# Patient Record
Sex: MALE | Race: AMERICAN INDIAN OR ALASKA NATIVE
[De-identification: names, ages, dates, MRNs, and addresses within clinical notes are randomized per-mention and may not be internally consistent; named-entity substitution may affect disease eponyms.]

---

## 2018-01-23 ENCOUNTER — HOSPITAL ENCOUNTER (OUTPATIENT)
Dept: HOSPITAL 5 - OR | Age: 58
Discharge: HOME | End: 2018-01-23
Attending: SURGERY
Payer: COMMERCIAL

## 2018-01-23 VITALS — DIASTOLIC BLOOD PRESSURE: 84 MMHG | SYSTOLIC BLOOD PRESSURE: 141 MMHG

## 2018-01-23 DIAGNOSIS — I10: ICD-10-CM

## 2018-01-23 DIAGNOSIS — Z87.891: ICD-10-CM

## 2018-01-23 DIAGNOSIS — M79.89: Primary | ICD-10-CM

## 2018-01-23 PROCEDURE — 84132 ASSAY OF SERUM POTASSIUM: CPT

## 2018-01-23 PROCEDURE — 21012 EXC FACE LES SBQ 2 CM/>: CPT

## 2018-01-23 PROCEDURE — 36415 COLL VENOUS BLD VENIPUNCTURE: CPT

## 2018-01-23 PROCEDURE — 88307 TISSUE EXAM BY PATHOLOGIST: CPT

## 2018-01-23 PROCEDURE — 88304 TISSUE EXAM BY PATHOLOGIST: CPT

## 2018-01-23 PROCEDURE — 88305 TISSUE EXAM BY PATHOLOGIST: CPT

## 2018-01-23 PROCEDURE — 21552 EXC NECK LES SC 3 CM/>: CPT

## 2018-01-23 NOTE — ANESTHESIA CONSULTATION
Anesthesia Consult and Med Hx





- Airway


Anesthetic Teeth Evaluation: Partials


ROM Head & Neck: Adequate


Mental/Hyoid Distance: Adequate


Mallampati Class: Class II


Intubation Access Assessment: Probably Good





- Pulmonary Exam


CTA: Yes





- Cardiac Exam


Cardiac Exam: RRR





- Pre-Operative Health Status


ASA Pre-Surgery Classification: ASA2


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 

complications)





- Pulmonary


Hx Smoking: Yes (SMOKED X 20 YR- STOPPED X 10 YRS)


Hx Sleep Apnea: No (CHRISTIANO PRE SCREEN HIGH RISK)





- Cardiovascular System


Hx Hypertension: Yes (2006)





- Central Nervous System


Hx Psychiatric Problems: No





- Other Systems


Hx Alcohol Use: No


Hx Substance Use: Yes (MARIJUANA; last a week ago)


Hx Cancer: No

## 2018-01-23 NOTE — SHORT STAY SUMMARY
Short Stay Documentation


Date of service: 01/23/18





- History


Principal diagnosis: multiple soft tissues masses of face and neck


H&P: obtained from office





- Allergies and Medications


Current Medications: 


 Allergies





No Known Allergies Allergy (Verified 01/18/18 15:29)


 





 Home Medications











 Medication  Instructions  Recorded  Confirmed  Last Taken  Type


 


Lisinopril/Hydrochlorothiazide 1 each PO DAILY 12/20/17 01/23/18 01/22/18 09:00 

History





[Zestoretic 10-12.5 mg Tablet]     


 


Verapamil ER [Calan Sr] 180 mg PO DAILY 12/20/17 01/23/18 01/23/18 06:00 History


 


Ibuprofen 800 mg PO Q8H PRN #30 tablet 01/23/18  Unknown Rx








Active Medications





Cefazolin Sodium (Ancef/Sterile Water 2 Gm/20 Ml)  2 gm IV PREOP NR


   Stop: 01/23/18 12:00


Famotidine (Pepcid)  20 mg IV PREOP NR


   Stop: 01/23/18 12:00


   Last Admin: 01/23/18 08:34 Dose:  20 mg


Sodium Chloride (Nacl 0.9% 1000 Ml)  1,000 mls @ 100 mls/hr IV AS DIRECT ANNI


   Stop: 01/23/18 23:59


   Last Admin: 01/23/18 08:33 Dose:  100 mls/hr











- Brief post op/procedure progress note


Date of procedure: 01/23/18


Pre-op diagnosis: multiple soft tissue masses face and neck


Post-op diagnosis: same


Procedure: 





excision of multiple soft tissue masses head and neck


Anesthesia: MAC, local


Findings: 





4 cysts:


1. Left neck: 2 cm


2. Right neck: 1 cm


3. Right scalp: 2 cm


4. Right face: 2cm


Surgeon: AGNES NARAYANAN


Estimated blood loss: minimal


Pathology: list (soft tissue masses (see findings))


Specimen disposition: to lab


Condition: stable





- Hospital course


Hospital course: 





Patient was observed in the PACU and discharged home when criteria was met.





- Disposition


Condition at discharge: Good


Disposition: DC-01 TO HOME OR SELFCARE





- Discharge Diagnoses


(1) Soft tissue mass


Status: Acute   





Short Stay Discharge Plan


Activity: other


Diet: regular


Wound: open to air, other (Do not submerge incisions in water. May shower 

tomorrow, do not scrub incisions)


Additional Instructions: 


Call surgeon's office if you have fevers >100.4, pain not controlled with pain 

medications, drainage/swelling around incisions.


Follow up with: 


KALEE GLASS MD [Primary Care Provider] - 7 Days


AGNES NARAYANAN DO [Staff Physician] - 7 Days


Prescriptions: 


Ibuprofen 800 mg PO Q8H PRN #30 tablet


 PRN Reason: Pain

## 2018-01-23 NOTE — OPERATIVE REPORT
Operative Report


Operative Report: 





Date of operation: 1/23/2018


Preoperative diagnosis: Multiple soft tissue masses of head and neck


Postoperative diagnosis: Multiple cysts of the head and neck


Procedure performed: Excision of soft tissue masses of head and neck


Surgeon: NICOLE Flynn DO


Anesthesia: Mac, local


Findings: 4 cysts


Specimen:


1. Left neck: 2 cm


2. Right neck: 1 cm


3. Right scalp: 2 cm


4. Right face: 2cm





Estimated blood loss: Less than 10 mL


Consultations: None


Disposition: Stable to PACU





HPI an indication: Patient is a 57-year-old male who presented to the surgical 

office with complaints of multiple soft tissue masses of his head and neck.  

The patient elected to have these masses removed because they're becoming 

larger.  All the risks of surgery were discussed with the patient, questions 

answered, consent was signed.





Procedure in detail: The patient was identified in the preoperative area, taken 

back to the operating room, placed on table in supine position.  After 

anesthesia was induced the left face and neck were prepped and draped in usual 

sterile fashion and timeout was performed.  A mixture of 50/50 1% lidocaine and 

0.25% Marcaine was injected in the skin over the intended incision site.  A 

elliptical incision was made in the skin over soft tissue mass using a 15 

blade.  Dissection was carried down to the skin and subcutaneous tissue using 

the 15 blade until the cyst wall was encountered.  Then using tenotomy scissors

, the cyst was circumferentially dissected free from the surrounding tissues.  

The cyst measured approximately 2 cm and it was passed off the table as a 

specimen.  The wound was irrigated and hemostasis achieved using 

electrocautery.  The deep dermal layer was closed with an interrupted 3-0 

Vicryl stitch and the skin was closed using running 4-0 Monocryl subcuticular 

stitches and skin glue.





I then turned my attention to the 3 remaining soft tissue masses on the right 

side of the head and neck.  The right scalp, face and neck were prepped and 

draped in usual sterile fashion.  I first started with the smaller mass on the 

right neck.  Local anesthetic was infiltrated into the skin.  A 1 cm incision 

was made over the mass using a 15 blade.  Dissection was carried down through 

the skin and subcutaneous tissue with the knife and the cyst was then dissected 

free from the surrounding tissues using tenotomy scissors.  The cyst measured 1 

cm, and was passed off the table as a specimen.  The wound was irrigated and 

hemostasis achieved.  The skin was closed with 4-0 Monocryl subcuticular stitch 

and skin glue.





Next, I removed the cysts of the right face and then the right scalp. Local 

anesthetic was infiltrated into the skin and an elliptical incision was made in 

the skin around the central opening of the cyst.  The remainder of the 

dissection was performed in a similar fashion to the other cysts.  The cyst of 

the right face measured 2 cm cyst of the right scalp measured 2 cm.  Both 

wounds were irrigated and hemostasis ensured.  The deep dermal layers were 

closed with 3-0 Vicryl interrupted stitches and the skin was closed with 

running 4-0 Monocryl subcuticular stitches and skin glue.





At the end of the case, all sponge, instrument, sharp counts were correct 2.  

The patient was awoken from anesthesia, and taken to PACU in stable condition.